# Patient Record
Sex: MALE
[De-identification: names, ages, dates, MRNs, and addresses within clinical notes are randomized per-mention and may not be internally consistent; named-entity substitution may affect disease eponyms.]

---

## 2020-01-15 PROBLEM — Z00.00 ENCOUNTER FOR PREVENTIVE HEALTH EXAMINATION: Status: ACTIVE | Noted: 2020-01-15

## 2020-01-22 ENCOUNTER — APPOINTMENT (OUTPATIENT)
Dept: RHEUMATOLOGY | Facility: CLINIC | Age: 60
End: 2020-01-22
Payer: MEDICAID

## 2020-01-22 VITALS
SYSTOLIC BLOOD PRESSURE: 145 MMHG | HEIGHT: 70 IN | BODY MASS INDEX: 26.7 KG/M2 | TEMPERATURE: 98.5 F | WEIGHT: 186.5 LBS | DIASTOLIC BLOOD PRESSURE: 88 MMHG | OXYGEN SATURATION: 96 % | HEART RATE: 83 BPM

## 2020-01-22 DIAGNOSIS — Z82.49 FAMILY HISTORY OF ISCHEMIC HEART DISEASE AND OTHER DISEASES OF THE CIRCULATORY SYSTEM: ICD-10-CM

## 2020-01-22 DIAGNOSIS — Z78.9 OTHER SPECIFIED HEALTH STATUS: ICD-10-CM

## 2020-01-22 DIAGNOSIS — M54.9 DORSALGIA, UNSPECIFIED: ICD-10-CM

## 2020-01-22 DIAGNOSIS — Z80.6 FAMILY HISTORY OF LEUKEMIA: ICD-10-CM

## 2020-01-22 DIAGNOSIS — M54.2 CERVICALGIA: ICD-10-CM

## 2020-01-22 DIAGNOSIS — Z82.3 FAMILY HISTORY OF STROKE: ICD-10-CM

## 2020-01-22 PROCEDURE — 99203 OFFICE O/P NEW LOW 30 MIN: CPT

## 2020-01-22 RX ORDER — ATORVASTATIN CALCIUM 20 MG/1
20 TABLET, FILM COATED ORAL
Refills: 0 | Status: ACTIVE | COMMUNITY

## 2020-01-22 RX ORDER — OMEPRAZOLE 20 MG/1
20 CAPSULE, DELAYED RELEASE ORAL
Refills: 0 | Status: ACTIVE | COMMUNITY

## 2020-01-22 RX ORDER — LEVOTHYROXINE SODIUM 50 UG/1
50 TABLET ORAL
Refills: 0 | Status: ACTIVE | COMMUNITY

## 2020-01-22 NOTE — HISTORY OF PRESENT ILLNESS
[FreeTextEntry1] : This is a 5-year-old man he comes with his wife. 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 His symptoms date back many years. He also has some hand discomfort he has tried many modalities he's tried physical therapy which has not helped he has seen pain management for many years he's received injections to his back which has helped periodically although recently it is stopped helping. He again has had arthroscopic surgery on his left knee is advised to have similar types of surgery to his right knee. He has had thyroid issues she is status post thyroidectomy he is on replacement. He also takes a total of a statin for high cholesterol. He is on omeprazole he uses Tylenol which does help somewhat he avoids anti-inflammatory medicines because of concerns about issues with side effects. He has no psoriasis is swollen joints he's had no nodule

## 2020-01-22 NOTE — PHYSICAL EXAM
[General Appearance - Alert] : alert [General Appearance - Well Nourished] : well nourished [Sclera] : the sclera and conjunctiva were normal [PERRL With Normal Accommodation] : pupils were equal in size, round, and reactive to light [Examination Of The Oral Cavity] : the lips and gums were normal [Outer Ear] : the ears and nose were normal in appearance [Oropharynx] : the oropharynx was normal [Auscultation Breath Sounds / Voice Sounds] : lungs were clear to auscultation bilaterally [Respiration, Rhythm And Depth] : normal respiratory rhythm and effort [Heart Rate And Rhythm] : heart rate was normal and rhythm regular [Edema] : there was no peripheral edema [Heart Sounds] : normal S1 and S2 [Skin Turgor] : normal skin turgor [] : no rash [No Focal Deficits] : no focal deficits [FreeTextEntry1] : No psoriasis

## 2020-01-22 NOTE — REVIEW OF SYSTEMS
[Feeling Tired] : feeling tired [Arthralgias] : arthralgias [Joint Pain] : joint pain [Joint Stiffness] : joint stiffness [Limb Pain] : limb pain [Joint Swelling] : joint swelling [Difficulty Walking] : difficulty walking [Limb Weakness] : limb weakness [Eye Pain] : no eye pain [Feeling Poorly] : not feeling poorly [Sore Throat] : no sore throat [Dry Eyes] : no dryness of the eyes [Red Eyes] : eyes not red [Cough] : no cough [Chest Pain] : no chest pain [Shortness Of Breath] : no shortness of breath [Constipation] : no constipation [Abdominal Pain] : no abdominal pain [SOB on Exertion] : no shortness of breath during exertion [Heartburn] : no heartburn [Diarrhea] : no diarrhea [Limb Swelling] : no limb swelling [Skin Lesions] : no skin lesions [Itching] : no itching [FreeTextEntry4] : no oral ulcers

## 2020-01-22 NOTE — ASSESSMENT
[FreeTextEntry1] : 5-year-old man long-standing history of musculoskeletal complaints physically back and knees clinical and x-ray evidence are all consistent with osteoarthritis. No evidence of inflammatory arthritis. Has tried physical therapy as well as Tylenol does go for injections have reassured him of the benign obviously discomforting nature of this process I did suggest consideration of ashly chi or yoga.

## 2021-08-02 ENCOUNTER — APPOINTMENT (OUTPATIENT)
Dept: ORTHOPEDIC SURGERY | Facility: CLINIC | Age: 61
End: 2021-08-02

## 2021-08-11 ENCOUNTER — APPOINTMENT (OUTPATIENT)
Dept: ORTHOPEDIC SURGERY | Facility: CLINIC | Age: 61
End: 2021-08-11
Payer: MEDICAID

## 2021-08-11 DIAGNOSIS — M17.4 OTHER BILATERAL SECONDARY OSTEOARTHRITIS OF KNEE: ICD-10-CM

## 2021-08-11 DIAGNOSIS — M54.12 RADICULOPATHY, CERVICAL REGION: ICD-10-CM

## 2021-08-11 DIAGNOSIS — M25.50 PAIN IN UNSPECIFIED JOINT: ICD-10-CM

## 2021-08-11 PROCEDURE — 73564 X-RAY EXAM KNEE 4 OR MORE: CPT | Mod: 50

## 2021-08-11 PROCEDURE — 99204 OFFICE O/P NEW MOD 45 MIN: CPT

## 2021-08-11 NOTE — HISTORY OF PRESENT ILLNESS
[de-identified] : 61 y/o male presents for Left knee pain 10 months s/p left knee TKA. He has been told it might be ITBand issues and is in Physical therapy. He comes here seeking second opinion on the source of pain. It hurts more with weather shift and with activity it is helped by ice. On another note he has classic signs of mild cervical radiculopathy with periscapular and trap discomfort. \par \par

## 2021-08-11 NOTE — PHYSICAL EXAM
[de-identified] : Cervical spine: decreased Hyperextension normal neuro exam upper extremities\par Left knee tender near ITBand at LFC and Gerdy's tubercle. Moderate effusion. No fever. \par ROM left knee 0-125 degrees.

## 2021-09-01 ENCOUNTER — APPOINTMENT (OUTPATIENT)
Dept: ORTHOPEDIC SURGERY | Facility: CLINIC | Age: 61
End: 2021-09-01
Payer: MEDICAID

## 2021-09-01 VITALS
BODY MASS INDEX: 26.63 KG/M2 | SYSTOLIC BLOOD PRESSURE: 128 MMHG | WEIGHT: 186 LBS | HEART RATE: 83 BPM | OXYGEN SATURATION: 96 % | HEIGHT: 70 IN | DIASTOLIC BLOOD PRESSURE: 84 MMHG

## 2021-09-01 DIAGNOSIS — Z96.652 PRESENCE OF LEFT ARTIFICIAL KNEE JOINT: ICD-10-CM

## 2021-09-01 PROCEDURE — 99213 OFFICE O/P EST LOW 20 MIN: CPT

## 2021-09-01 NOTE — HISTORY OF PRESENT ILLNESS
[8] : a current pain level of 8/10 [Walking] : worsened by walking [Ice] : relieved by ice [de-identified] : 9/1/21: 61y/o male referred by Dr. Frederick for painful left total knee replacement. Surgery was performed on 10/27/20 by Dr. Garcia at [facility]. He had an uneventful surgery and was discharged on POD 1. He had a smooth recovery but complains of intermittent effusions with associated inferolateral knee pain. These occur more commonly with changes in the weather. When the knee is not swollen, he does not have any pain. Even during painful episodes, he has no limitation in exercise tolerance and generally does not need to use any pain medications. Very seldom will he use some Motrin.  [de-identified] : patient describes pain as sharp and localized.

## 2021-09-01 NOTE — DISCUSSION/SUMMARY
[de-identified] : 61y/o male with chronic pain 11mo following L TKA\par - I cannot identify any mechanical issue with the knee that would benefit from further surgery. Suspicion for infection is very low based on clinical presentation and prior labs. No indication for surgical management at this time.\par - We discussed that about 15-20% of TKA recipients do experience some element of chronic pain, which is usually considerably less intense than the arthritic pain preoperatively. He acknowledges that this is the case for himself.\par - We discussed a possible aspiration. I explained that an aspiration is more for diagnostic purposes than therapeutic, and that the effusion may recur. He opted to defer, which I think is reasonable given the results of the prior bloodwork\par - Cont PT/HEP\par - Cont Motrin as needed\par - Follow up with Dr. Garcia for ongoing management

## 2021-09-01 NOTE — PHYSICAL EXAM
[de-identified] : General appearance: well nourished and hydrated, pleasant, alert and oriented x 3, cooperative.  \par HEENT: normocephalic, EOM intact, wearing mask, external auditory canal clear.  \par Cardiovascular: no lower leg edema, no varicosities, dorsalis pedis pulses palpable and symmetric.  \par Lymphatics: no palpable lymphadenopathy, no lymphedema.  \par Neurologic: sensation is normal, no muscle weakness in upper or lower extremities, patella tendon reflexes present and symmetric.  \par Dermatologic: skin moist, warm, no rash.  \par Spine: cervical spine with normal lordosis and painless range of motion, thoracic spine with normal kyphosis and painless range of motion, lumbosacral spine with normal lordosis and painless range of motion. \par Gait: normal.  \par \par Left knee:\par - Soft tissue swelling: moderate\par - Ecchymosis: none\par - Erythema: none\par - Effusion: large\par - Wounds: healed midline incision\par - Alignment: normal\par - Tenderness: mild lateral joint line\par - ROM: 0-135\par - Collateral laxity: none\par - Cruciate laxity: less than 5mm ant/post translation on flexion drawers\par - Popliteal angle (degrees): 45\par - Quad strength: 5/5 [de-identified] : AP pelvis and 4 views of the bilateral knees (weightbearing AP, weightbearing Green, weightbearing lateral, and Sunrise) were obtained today, interpreted by me, and reviewed with the patient.\par \par Pelvic alignment: normal\par \par Right hip --\par Alignment: normal\par Arthritis: none\par Deformity: none\par Osteonecrosis: none\par \par Left hip --\par Alignment: normal\par Arthritis: none\par Deformity: none\par Osteonecrosis: none\par \par Right knee --\par Alignment: mild varus\par Arthritis: moderate medial compartment, KL3\par Patellar height: normal\par Patellar tracking: central\par \par Left knee -- TKA in position (cemented PS Triathlon with resurfaced patella). All components appear to be well fixed in reasonable alignment. Patella sits at appropriate height and tracks centrally.\par \par Bloodwork from 8/12/21 was reviewed. No leukocytosis or elevation in ESR.